# Patient Record
Sex: MALE | Race: WHITE | NOT HISPANIC OR LATINO | Employment: STUDENT | RURAL
[De-identification: names, ages, dates, MRNs, and addresses within clinical notes are randomized per-mention and may not be internally consistent; named-entity substitution may affect disease eponyms.]

---
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
## 2024-01-25 ENCOUNTER — OFFICE VISIT (OUTPATIENT)
Dept: ORTHOPEDICS | Facility: CLINIC | Age: 19
End: 2024-01-25
Payer: COMMERCIAL

## 2024-01-25 DIAGNOSIS — S53.441D SPRAIN OF ULNAR COLLATERAL LIGAMENT OF RIGHT ELBOW, SUBSEQUENT ENCOUNTER: Primary | ICD-10-CM

## 2024-01-25 PROCEDURE — 99212 OFFICE O/P EST SF 10 MIN: CPT | Mod: PBBFAC | Performed by: ORTHOPAEDIC SURGERY

## 2024-01-25 PROCEDURE — 99213 OFFICE O/P EST LOW 20 MIN: CPT | Mod: S$PBB,,, | Performed by: ORTHOPAEDIC SURGERY

## 2024-01-25 NOTE — PROGRESS NOTES
18 y.o. Male returns to clinic for a follow up visit regarding     ICD-10-CM ICD-9-CM   1. Sprain of ulnar collateral ligament of right elbow, subsequent encounter  S53.441D V58.89     841.1        Pt is here for recheck right elbow pain. States he has been doing home therapy on his own, no complaints of pain.       No past medical history on file.  No past surgical history on file.      PHYSICAL EXAMINATION:    Ortho/SPM Exam  He has no significant tenderness over the sublime tubercle and medial epicondyle with range of motion today.  He has no pain with moving valgus stress test.  Excellent range of motion of the shoulder and elbow.    IMAGING:  No results found.     ASSESSMENT:      ICD-10-CM ICD-9-CM   1. Sprain of ulnar collateral ligament of right elbow, subsequent encounter  S53.441D V58.89     841.1       PLAN:     -Findings and treatment options were discussed with the patient  -All questions answered      He is throwing now he is completed his throwing program he has having no pain no symptoms feels like he is close to 100% okay to progress activity as tolerated will follow up as needed    There are no Patient Instructions on file for this visit.      No orders of the defined types were placed in this encounter.        Procedures

## 2024-04-03 ENCOUNTER — ATHLETIC TRAINING SESSION (OUTPATIENT)
Dept: SPORTS MEDICINE | Facility: CLINIC | Age: 19
End: 2024-04-03
Payer: COMMERCIAL

## 2024-04-03 NOTE — PROGRESS NOTES
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    Ath complains of right elbow soreness, both medially and laterally.     Handedness: right-handed  Sport played:      Level:          David also participates in baseball.      ROS              Objective:       General: David is well-developed, well-nourished, appears stated age, in no acute distress, alert and oriented to time, place and person.     AT Session          Assessment:     Status:  Full    Date Seen:  3/20/2024    Date of Injury:  3/19/2024    Date Out:     Date Cleared:       Plan:       1. US/cup/ stretch/ ice  2. Physician Referral: no  3. ED Referral:no  4. Parent/Guardian Notified: No  5. All questions were answered, ath. will contact me for questions or concerns in  the interim.  6.         Eligible to use School Insurance: Yes

## 2024-04-19 ENCOUNTER — ATHLETIC TRAINING SESSION (OUTPATIENT)
Dept: SPORTS MEDICINE | Facility: CLINIC | Age: 19
End: 2024-04-19
Payer: COMMERCIAL

## 2024-04-23 ENCOUNTER — ATHLETIC TRAINING SESSION (OUTPATIENT)
Dept: SPORTS MEDICINE | Facility: CLINIC | Age: 19
End: 2024-04-23
Payer: COMMERCIAL

## 2024-05-03 ENCOUNTER — ATHLETIC TRAINING SESSION (OUTPATIENT)
Dept: SPORTS MEDICINE | Facility: CLINIC | Age: 19
End: 2024-05-03
Payer: COMMERCIAL

## 2024-05-03 NOTE — PROGRESS NOTES
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    After a weight room session the athlete complains of a sore low back.     Handedness: right-handed  Sport played:      Level:          David also participates in baseball.      ROS              Objective:       General: David is well-developed, well-nourished, appears stated age, in no acute distress, alert and oriented to time, place and person.         General Musculoskeletal Exam   Gait: normal     Back (L-Spine & T-Spine) / Neck (C-Spine) Exam     Tenderness Right paramedian tenderness of the Lower L-Spine. Left paramedian tenderness of the Lower L-Spine.               Assessment:     Status: F - Full Participation    Date Seen:  05/03/2024    Date of Injury:  05/02/2024    Date Out:  05/02/2024    Date Cleared:  05/03/2024      Plan:       1. Heat/stretch  2. Physician Referral: no  3. ED Referral:no  4. Parent/Guardian Notified: No  5. All questions were answered, ath. will contact me for questions or concerns in  the interim.  6.         Eligible to use School Insurance: Yes